# Patient Record
Sex: FEMALE | Race: OTHER | HISPANIC OR LATINO
[De-identification: names, ages, dates, MRNs, and addresses within clinical notes are randomized per-mention and may not be internally consistent; named-entity substitution may affect disease eponyms.]

---

## 2020-01-31 PROBLEM — Z00.00 ENCOUNTER FOR PREVENTIVE HEALTH EXAMINATION: Status: ACTIVE | Noted: 2020-01-31

## 2020-02-13 ENCOUNTER — TRANSCRIPTION ENCOUNTER (OUTPATIENT)
Age: 40
End: 2020-02-13

## 2020-02-13 ENCOUNTER — APPOINTMENT (OUTPATIENT)
Dept: INTERNAL MEDICINE | Facility: CLINIC | Age: 40
End: 2020-02-13
Payer: COMMERCIAL

## 2020-02-13 VITALS
WEIGHT: 142 LBS | TEMPERATURE: 99.6 F | BODY MASS INDEX: 28.63 KG/M2 | SYSTOLIC BLOOD PRESSURE: 140 MMHG | OXYGEN SATURATION: 99 % | DIASTOLIC BLOOD PRESSURE: 70 MMHG | HEART RATE: 76 BPM | HEIGHT: 59 IN | RESPIRATION RATE: 16 BRPM

## 2020-02-13 VITALS — SYSTOLIC BLOOD PRESSURE: 110 MMHG | DIASTOLIC BLOOD PRESSURE: 70 MMHG

## 2020-02-13 DIAGNOSIS — Z80.3 FAMILY HISTORY OF MALIGNANT NEOPLASM OF BREAST: ICD-10-CM

## 2020-02-13 DIAGNOSIS — Z76.89 PERSONS ENCOUNTERING HEALTH SERVICES IN OTHER SPECIFIED CIRCUMSTANCES: ICD-10-CM

## 2020-02-13 DIAGNOSIS — Z83.3 FAMILY HISTORY OF DIABETES MELLITUS: ICD-10-CM

## 2020-02-13 DIAGNOSIS — Z78.9 OTHER SPECIFIED HEALTH STATUS: ICD-10-CM

## 2020-02-13 PROCEDURE — 99204 OFFICE O/P NEW MOD 45 MIN: CPT

## 2020-02-13 NOTE — REVIEW OF SYSTEMS
[Negative] : Genitourinary [Nasal Discharge] : nasal discharge [Postnasal Drip] : postnasal drip [Abdominal Pain] : abdominal pain [Headache] : headache [Dizziness] : no dizziness [Memory Loss] : no memory loss [FreeTextEntry7] : epig see HPI [de-identified] : before menses

## 2020-02-13 NOTE — HISTORY OF PRESENT ILLNESS
[FreeTextEntry8] : here to establish care , former px in [place], doing well,  except epig discomfort after eating and perimenstrual HA

## 2020-03-30 ENCOUNTER — APPOINTMENT (OUTPATIENT)
Dept: INTERNAL MEDICINE | Facility: CLINIC | Age: 40
End: 2020-03-30

## 2024-05-29 ENCOUNTER — RESULT REVIEW (OUTPATIENT)
Age: 44
End: 2024-05-29

## 2024-05-29 ENCOUNTER — APPOINTMENT (OUTPATIENT)
Dept: HEMATOLOGY ONCOLOGY | Facility: CLINIC | Age: 44
End: 2024-05-29

## 2024-05-29 VITALS
HEIGHT: 59 IN | HEART RATE: 70 BPM | TEMPERATURE: 97.8 F | WEIGHT: 145.44 LBS | RESPIRATION RATE: 16 BRPM | SYSTOLIC BLOOD PRESSURE: 123 MMHG | DIASTOLIC BLOOD PRESSURE: 79 MMHG | BODY MASS INDEX: 29.32 KG/M2 | OXYGEN SATURATION: 99 %

## 2024-05-29 DIAGNOSIS — D50.9 IRON DEFICIENCY ANEMIA, UNSPECIFIED: ICD-10-CM

## 2024-05-29 DIAGNOSIS — K29.70 GASTRITIS, UNSPECIFIED, W/OUT BLEEDING: ICD-10-CM

## 2024-05-29 PROCEDURE — 99203 OFFICE O/P NEW LOW 30 MIN: CPT

## 2024-05-29 RX ORDER — IRON/IRON ASP GLY/FA/MV-MIN 38 125-25-1MG
TABLET ORAL
Refills: 0 | Status: ACTIVE | COMMUNITY

## 2024-05-29 RX ORDER — ESOMEPRAZOLE MAGNESIUM 20 MG/1
20 CAPSULE, DELAYED RELEASE ORAL DAILY
Refills: 0 | Status: COMPLETED | COMMUNITY
Start: 2020-02-13 | End: 2024-05-29

## 2024-05-30 ENCOUNTER — TRANSCRIPTION ENCOUNTER (OUTPATIENT)
Age: 44
End: 2024-05-30

## 2024-05-31 PROBLEM — K29.70 GASTRITIS: Status: ACTIVE | Noted: 2020-02-13

## 2024-05-31 PROBLEM — D50.9 IRON DEFICIENCY ANEMIA: Status: ACTIVE | Noted: 2020-02-13

## 2024-05-31 NOTE — ASSESSMENT
[FreeTextEntry1] :   #Iron deficiency anemia secondary to blood loss vs malabsorption vs decreased PO intake menorrhagia and gastritis Discussed physiology of iron and erythropoiesis. Discussed iron tablet vs liquid vs intravenous Patient compliant with oral iron. will do IV iron with feraheme 2 doses  Follow up 3 after last iron appointment   Labs next appointment: CBC, iron, B12, ferritin, retic

## 2024-05-31 NOTE — REVIEW OF SYSTEMS
[Negative] : Heme/Lymph [FreeTextEntry3] : burning in eyes maybe from allergies [FreeTextEntry4] : tickle in throat maybe from terence

## 2024-05-31 NOTE — HISTORY OF PRESENT ILLNESS
[de-identified] : 43 year F  here with iron deficiency anemia. Has gastritis. Had H pylori, had two courses of antibiotics, now resolved as of Nov 2023.  She  was told she had iron deficiency anemia as a child.   Treated with oral iron for a year.   Denies reactions Denies history of thyroid disorders or celiac disease. Has heavy menses  Had hair loss. Lack of appetite. Has intermitent fatigue.

## 2024-06-03 RX ORDER — FERROUS SULFATE 220 (44)/5
220 (44 FE) SOLUTION, ORAL ORAL
Qty: 100 | Refills: 5 | Status: ACTIVE | COMMUNITY
Start: 2024-05-29 | End: 1900-01-01